# Patient Record
Sex: MALE | Race: WHITE | URBAN - NONMETROPOLITAN AREA
[De-identification: names, ages, dates, MRNs, and addresses within clinical notes are randomized per-mention and may not be internally consistent; named-entity substitution may affect disease eponyms.]

---

## 2018-07-10 ENCOUNTER — OFFICE VISIT (OUTPATIENT)
Dept: FAMILY MEDICINE CLINIC | Age: 53
End: 2018-07-10
Payer: MEDICARE

## 2018-07-10 VITALS
OXYGEN SATURATION: 96 % | BODY MASS INDEX: 40.89 KG/M2 | DIASTOLIC BLOOD PRESSURE: 94 MMHG | RESPIRATION RATE: 18 BRPM | HEIGHT: 70 IN | HEART RATE: 78 BPM | TEMPERATURE: 99.1 F | WEIGHT: 285.6 LBS | SYSTOLIC BLOOD PRESSURE: 179 MMHG

## 2018-07-10 DIAGNOSIS — J40 BRONCHITIS: Primary | ICD-10-CM

## 2018-07-10 PROCEDURE — 99202 OFFICE O/P NEW SF 15 MIN: CPT | Performed by: NURSE PRACTITIONER

## 2018-07-10 RX ORDER — METOPROLOL SUCCINATE 100 MG/1
TABLET, EXTENDED RELEASE ORAL
Refills: 12 | COMMUNITY
Start: 2018-06-24

## 2018-07-10 RX ORDER — AZITHROMYCIN 250 MG/1
TABLET, FILM COATED ORAL
Qty: 6 TABLET | Refills: 0 | Status: SHIPPED | OUTPATIENT
Start: 2018-07-10 | End: 2019-02-15 | Stop reason: ALTCHOICE

## 2018-07-10 RX ORDER — HYDROCODONE BITARTRATE AND ACETAMINOPHEN 5; 325 MG/1; MG/1
TABLET ORAL
Refills: 0 | COMMUNITY
Start: 2018-05-31

## 2018-07-10 RX ORDER — PREDNISONE 20 MG/1
20 TABLET ORAL DAILY
Qty: 5 TABLET | Refills: 0 | Status: SHIPPED | OUTPATIENT
Start: 2018-07-10 | End: 2018-07-15

## 2018-07-10 ASSESSMENT — ENCOUNTER SYMPTOMS
SHORTNESS OF BREATH: 1
BACK PAIN: 1
CHEST TIGHTNESS: 1
NAUSEA: 0
CONSTIPATION: 0
RHINORRHEA: 1
SINUS PRESSURE: 1
SINUS PAIN: 0
SHORTNESS OF BREATH: 0
SORE THROAT: 0
FACIAL SWELLING: 0
WHEEZING: 1
VOMITING: 0
EYE ITCHING: 0
EYE DISCHARGE: 0
TROUBLE SWALLOWING: 0
COLOR CHANGE: 0
CHEST TIGHTNESS: 0
COUGH: 1
HEARTBURN: 0
RHINORRHEA: 0
EYE REDNESS: 0
NAUSEA: 1
DIARRHEA: 0

## 2018-07-10 ASSESSMENT — PATIENT HEALTH QUESTIONNAIRE - PHQ9
2. FEELING DOWN, DEPRESSED OR HOPELESS: 1
SUM OF ALL RESPONSES TO PHQ9 QUESTIONS 1 & 2: 1
SUM OF ALL RESPONSES TO PHQ QUESTIONS 1-9: 1
1. LITTLE INTEREST OR PLEASURE IN DOING THINGS: 0

## 2018-07-10 NOTE — PROGRESS NOTES
Objective:   Physical Exam   Constitutional: He appears well-nourished. Neck: Neck supple. No JVD present. No neck rigidity. No erythema (Blisters previously present that he was given cream for) present. No thyromegaly present. Cardiovascular: Normal rate and regular rhythm. Pulmonary/Chest: No accessory muscle usage. He has decreased breath sounds in the left middle field. Assessment:      Marielnia Dec is 47yo M presenting w/ cough and SOB suggestive of bronchitis. Plan:       Prescribe azithromycin antibiotic and steroid PO to reduce inflammation and help cough. Advise to get flu vaccine and pneumonia vaccine. Recheck BP before discharge.

## 2018-07-10 NOTE — PROGRESS NOTES
change, fever, unexpected weight change and weight loss. HENT: Positive for congestion and sinus pressure. Negative for ear pain, facial swelling, postnasal drip, rhinorrhea, sinus pain, sore throat and trouble swallowing. Eyes: Negative for discharge, redness and itching. Respiratory: Positive for cough and wheezing. Negative for chest tightness and shortness of breath. Cardiovascular: Negative for chest pain. Gastrointestinal: Negative for diarrhea, heartburn, nausea and vomiting. Musculoskeletal: Negative for arthralgias and myalgias. Skin: Negative for color change, pallor and rash. Neurological: Negative for dizziness and headaches. Objective:     BP (!) 179/94 (Site: Left Arm)   Pulse 78   Temp 99.1 °F (37.3 °C) (Oral)   Resp 18   Ht 5' 10\" (1.778 m)   Wt 285 lb 9.6 oz (129.5 kg)   SpO2 96%   BMI 40.98 kg/m²     Physical Exam   Constitutional: He is oriented to person, place, and time. Vital signs are normal. He appears well-developed and well-nourished. He is active and cooperative. Non-toxic appearance. He does not have a sickly appearance. He does not appear ill. No distress. HENT:   Head: Normocephalic and atraumatic. Right Ear: Hearing, external ear and ear canal normal. A middle ear effusion is present. Left Ear: Hearing, external ear and ear canal normal. A middle ear effusion is present. Nose: Nose normal. Right sinus exhibits no maxillary sinus tenderness and no frontal sinus tenderness. Left sinus exhibits no maxillary sinus tenderness and no frontal sinus tenderness. Mouth/Throat: Uvula is midline and mucous membranes are normal. Posterior oropharyngeal erythema present. Cardiovascular: Normal rate and regular rhythm. Pulmonary/Chest: Effort normal. He has decreased breath sounds. Lymphadenopathy:        Head (right side): No submental, no submandibular, no tonsillar, no preauricular, no posterior auricular and no occipital adenopathy present.

## 2019-02-15 ENCOUNTER — OFFICE VISIT (OUTPATIENT)
Dept: FAMILY MEDICINE CLINIC | Age: 54
End: 2019-02-15
Payer: MEDICARE

## 2019-02-15 VITALS
HEART RATE: 69 BPM | BODY MASS INDEX: 43.23 KG/M2 | WEIGHT: 302 LBS | DIASTOLIC BLOOD PRESSURE: 78 MMHG | OXYGEN SATURATION: 95 % | SYSTOLIC BLOOD PRESSURE: 136 MMHG | RESPIRATION RATE: 22 BRPM | HEIGHT: 70 IN

## 2019-02-15 DIAGNOSIS — S05.01XA ABRASION OF RIGHT CORNEA, INITIAL ENCOUNTER: Primary | ICD-10-CM

## 2019-02-15 PROCEDURE — 99213 OFFICE O/P EST LOW 20 MIN: CPT | Performed by: NURSE PRACTITIONER

## 2019-02-15 RX ORDER — GENTAMICIN SULFATE 3 MG/ML
1 SOLUTION/ DROPS OPHTHALMIC 4 TIMES DAILY
Qty: 1 BOTTLE | Refills: 0 | Status: SHIPPED | OUTPATIENT
Start: 2019-02-15 | End: 2019-02-22

## 2019-02-15 RX ORDER — OMEGA-3 FATTY ACIDS/FISH OIL 300-1000MG
1 CAPSULE ORAL
COMMUNITY

## 2019-02-15 ASSESSMENT — PATIENT HEALTH QUESTIONNAIRE - PHQ9
SUM OF ALL RESPONSES TO PHQ QUESTIONS 1-9: 1
2. FEELING DOWN, DEPRESSED OR HOPELESS: 0
SUM OF ALL RESPONSES TO PHQ9 QUESTIONS 1 & 2: 1
1. LITTLE INTEREST OR PLEASURE IN DOING THINGS: 1
SUM OF ALL RESPONSES TO PHQ QUESTIONS 1-9: 1

## 2019-02-15 ASSESSMENT — ENCOUNTER SYMPTOMS
EYE DISCHARGE: 1
EYE ITCHING: 0
SHORTNESS OF BREATH: 0
PHOTOPHOBIA: 0
EYE PAIN: 1
EYE REDNESS: 1

## 2019-05-06 ENCOUNTER — TELEPHONE (OUTPATIENT)
Dept: UROLOGY | Age: 54
End: 2019-05-06

## 2019-05-06 NOTE — TELEPHONE ENCOUNTER
Called and spoke with patient's wife - transition of care received from [facility].   Wife stated she was going to try and get patient in with [facility]

## 2019-05-16 DIAGNOSIS — R10.9 FLANK PAIN: ICD-10-CM

## 2019-05-16 PROBLEM — R31.9 HEMATURIA: Status: ACTIVE | Noted: 2019-05-16

## 2019-05-22 ENCOUNTER — OFFICE VISIT (OUTPATIENT)
Dept: UROLOGY | Age: 54
End: 2019-05-22
Payer: MEDICAID

## 2019-05-22 ENCOUNTER — HOSPITAL ENCOUNTER (OUTPATIENT)
Age: 54
Setting detail: SPECIMEN
Discharge: HOME | End: 2019-05-22
Payer: MEDICAID

## 2019-05-22 VITALS
WEIGHT: 296 LBS | SYSTOLIC BLOOD PRESSURE: 135 MMHG | HEART RATE: 59 BPM | HEIGHT: 70 IN | TEMPERATURE: 98.9 F | OXYGEN SATURATION: 99 % | BODY MASS INDEX: 42.37 KG/M2 | DIASTOLIC BLOOD PRESSURE: 87 MMHG

## 2019-05-22 DIAGNOSIS — R31.0 GROSS HEMATURIA: ICD-10-CM

## 2019-05-22 DIAGNOSIS — N20.0 RENAL CALCULUS: ICD-10-CM

## 2019-05-22 DIAGNOSIS — N20.0 RENAL CALCULUS: Primary | ICD-10-CM

## 2019-05-22 LAB
-: ABNORMAL
AMORPHOUS: ABNORMAL
BACTERIA: ABNORMAL
BILIRUBIN URINE: NEGATIVE
CASTS UA: ABNORMAL /LPF
COLOR: YELLOW
COMMENT UA: ABNORMAL
CRYSTALS, UA: ABNORMAL /HPF
EPITHELIAL CELLS UA: ABNORMAL /HPF (ref 0–5)
GLUCOSE URINE: NEGATIVE
KETONES, URINE: NEGATIVE
LEUKOCYTE ESTERASE, URINE: NEGATIVE
MUCUS: ABNORMAL
NITRITE, URINE: NEGATIVE
OTHER OBSERVATIONS UA: ABNORMAL
PH UA: 6 (ref 5–9)
PROTEIN UA: NEGATIVE
RBC UA: ABNORMAL /HPF (ref 0–2)
RENAL EPITHELIAL, UA: ABNORMAL /HPF
SPECIFIC GRAVITY UA: 1.02 (ref 1.01–1.02)
TRICHOMONAS: ABNORMAL
TURBIDITY: CLEAR
URINE HGB: NEGATIVE
UROBILINOGEN, URINE: NORMAL
WBC UA: ABNORMAL /HPF (ref 0–5)
YEAST: ABNORMAL

## 2019-05-22 PROCEDURE — 99204 OFFICE O/P NEW MOD 45 MIN: CPT | Performed by: UROLOGY

## 2019-05-22 PROCEDURE — 87086 URINE CULTURE/COLONY COUNT: CPT

## 2019-05-22 PROCEDURE — 81001 URINALYSIS AUTO W/SCOPE: CPT

## 2019-05-22 RX ORDER — ALBUTEROL SULFATE 90 UG/1
AEROSOL, METERED RESPIRATORY (INHALATION)
COMMUNITY
Start: 2019-04-12 | End: 2019-05-22 | Stop reason: SDUPTHER

## 2019-05-22 ASSESSMENT — ENCOUNTER SYMPTOMS
VOMITING: 0
COLOR CHANGE: 0
SHORTNESS OF BREATH: 0
WHEEZING: 0
NAUSEA: 0
BACK PAIN: 0
COUGH: 0
EYE PAIN: 0
ABDOMINAL PAIN: 0
EYE REDNESS: 0

## 2019-05-22 NOTE — PROGRESS NOTES
HPI:    Patient is a 62 y.o. male in no acute distress. He is alert and oriented to person, place, and time. Patient is being seen as a new patient. Patient was referred over from the emergency department. Patient did have CT scan performed over there. The report for this film was able to be reviewed today. This did not show any significant calculi. Patient does claim that he had an episode of gross hematuria and possible stone passage prior to coming to the emergency department. He reports no pain today. Patient has had several stones in the past.  His most recent stone is approximately 4 years ago. He has been passing stones since he was 23years old. He has seen urology in the past.  He has never had a metabolic stone workup. He did decline to have a metabolic stone workup today. He has minimal pain today. No lower urinary tract symptoms. Patient is a nonsmoker and works as a . Past Medical History:   Diagnosis Date    Asthma     CHF [facility]congestive heart failure) ([facility])     History of kidney stones     Hypertension     Sleep apnea in adult      Past Surgical History:   Procedure Laterality Date    CHOLECYSTECTOMY      HERNIA REPAIR      SHOULDER SURGERY Left      Outpatient Encounter Medications as of 5/22/2019   Medication Sig Dispense Refill    ibuprofen (ADVIL MIGRAINE) 200 MG CAPS Take 1 capsule by mouth      VENTOLIN  (90 Base) MCG/ACT inhaler INHALE 2 PUFFS BY MOUTH EVERY 4 HOURS AS NEEDED FOR WHEEZING  12    HYDROcodone-acetaminophen (NORCO) 5-325 MG per tablet TAKE 1 TABLET BY MOUTH EVERY 4 HOURS AS NEEDED FOR PAIN  0    metoprolol succinate (TOPROL XL) 100 MG extended release tablet TAKE 1 TABLET BY MOUTH EVERY DAY  12    [DISCONTINUED] METOPROLOL SUCCINATE PO 5 mg      [DISCONTINUED] albuterol sulfate HFA (VENTOLIN HFA) 108 (90 Base) MCG/ACT inhaler        No facility-administered encounter medications on file as of 5/22/2019.        Current normal.  Skin: Normal  Lungs: Respiratory effort normal  Cardiovascular:  Normal peripheral pulses  Abdomen: Soft, non-tender, non-distended with no CVA, flank pain, hepatosplenomegaly or hernia. Kidneys normal.  Bladder non-tender and not distended. Lymphatics: no palpable lymphadenopathy  Penis normal  Urethral meatus normal  Scrotal exam normal  Testicles normal bilaterally  Epididymis normal bilaterally  No evidence of inguinal hernia      No results found for: BUN  No results found for: CREATININE  No results found for: PSA    ASSESSMENT:  This is a 62 y.o. male with the following diagnoses:   Diagnosis Orders   1. Renal calculus  XR ABDOMEN (KUB) (SINGLE AP VIEW)   2. Gross hematuria           PLAN:  We will plan for cystoscopy for lower tract visualization. We will also plan for repeat KUB in 1 year for stone follow-up. We did educate him on stone prevention today.

## 2019-05-23 LAB
CULTURE: NO GROWTH
Lab: NORMAL
SPECIMEN DESCRIPTION: NORMAL

## 2019-05-24 ENCOUNTER — TELEPHONE (OUTPATIENT)
Dept: UROLOGY | Age: 54
End: 2019-05-24

## 2019-05-24 NOTE — TELEPHONE ENCOUNTER
Called patient and informed him of urine culture results.   Advised to keep follow up for cysto as planned

## 2019-05-24 NOTE — TELEPHONE ENCOUNTER
----- Message from OCHOA Richardson - CNP sent at 5/24/2019  9:46 AM EDT -----  Call pt - urine cx reviewed and negative for UTI.  Keep cysto as planned

## 2019-06-24 ENCOUNTER — PROCEDURE VISIT (OUTPATIENT)
Dept: UROLOGY | Age: 54
End: 2019-06-24
Payer: MEDICARE

## 2019-06-24 VITALS — BODY MASS INDEX: 43.19 KG/M2 | DIASTOLIC BLOOD PRESSURE: 78 MMHG | SYSTOLIC BLOOD PRESSURE: 142 MMHG | WEIGHT: 301 LBS

## 2019-06-24 DIAGNOSIS — R31.0 GROSS HEMATURIA: ICD-10-CM

## 2019-06-24 DIAGNOSIS — N20.0 RENAL CALCULUS: Primary | ICD-10-CM

## 2019-06-24 PROCEDURE — 52000 CYSTOURETHROSCOPY: CPT | Performed by: UROLOGY

## 2019-06-24 PROCEDURE — 99213 OFFICE O/P EST LOW 20 MIN: CPT | Performed by: UROLOGY

## 2019-06-24 ASSESSMENT — ENCOUNTER SYMPTOMS
NAUSEA: 0
BACK PAIN: 0
WHEEZING: 0
COUGH: 0
EYE PAIN: 0
COLOR CHANGE: 0
SHORTNESS OF BREATH: 0
VOMITING: 0
EYE REDNESS: 0
ABDOMINAL PAIN: 0

## 2019-06-24 NOTE — PROGRESS NOTES
HPI:    Patient is a 62 y.o. male in no acute distress. He is alert and oriented to person, place, and time. History  Patient is being seen as a new patient. Patient was referred over from the emergency department. Patient did have CT scan performed over there. The report for this film was able to be reviewed today. This did not show any significant calculi. Patient does claim that he had an episode of gross hematuria and possible stone passage prior to coming to the emergency department. He reports no pain today. Patient has had several stones in the past.  His most recent stone is approximately 4 years ago. He has been passing stones since he was 23years old. He has seen urology in the past.  He has never had a metabolic stone workup. He did decline to have a metabolic stone workup today. He has minimal pain today. No lower urinary tract symptoms. Patient is a nonsmoker and works as a . Currently  Patient here today for lower tract visualization for gross hematuria. Patient CT scan have been previously reviewed. We were able to independently review the films today and agree with her previous read. Patient has had no gross hematuria dysuria since last visit. He reports no flank pain nausea or vomiting. Patient said several stones in the past.  We did go over stone prevention from dietary standpoint with him again today. Her pain today    Cystoscopy Procedure Note    Pre-operative Diagnosis: gross hematuria    Post-operative Diagnosis: Same     Surgeon: Fabrice Mcnamara     Assistants: None    Anesthesia : Local    Procedure Details   The risks, benefits, complications, treatment options, and expected outcomes were discussed with the patient. The patient concurred with the proposed plan, giving informed consent. Cystoscopy was performed today under local anesthesia, using sterile technique.  The patient was placed in the dorsal lithotomy position, prepped with CHG, and draped in the usual sterile fashion. A 14 French flexible cystoscope was used to systematically inspect both the urethra and bladder in their entirety. Findings:  Anterior urethra: normal without strictures  Hyperplasia: bilobar  Bladder: Normal mucosa, without lesions. Ureteral orifice(s) was/were seen in the normal position and effluxing clear urine  Trabeculations No  Diverticulum No  Description: Small stones dependent portion of the bladder         Specimens: Cytology/urine culture No                 Complications:  None; patient tolerated the procedure well. Disposition: home           Condition: stable      Past Medical History:   Diagnosis Date    Asthma     CHF (congestive heart failure) ([facility])     History of kidney stones     Hypertension     Sleep apnea in adult      Past Surgical History:   Procedure Laterality Date    CHOLECYSTECTOMY      HERNIA REPAIR      SHOULDER SURGERY Left      Outpatient Encounter Medications as of 6/24/2019   Medication Sig Dispense Refill    ibuprofen (ADVIL MIGRAINE) 200 MG CAPS Take 1 capsule by mouth      VENTOLIN  (90 Base) MCG/ACT inhaler INHALE 2 PUFFS BY MOUTH EVERY 4 HOURS AS NEEDED FOR WHEEZING  12    HYDROcodone-acetaminophen (NORCO) 5-325 MG per tablet TAKE 1 TABLET BY MOUTH EVERY 4 HOURS AS NEEDED FOR PAIN  0    metoprolol succinate (TOPROL XL) 100 MG extended release tablet TAKE 1 TABLET BY MOUTH EVERY DAY  12     No facility-administered encounter medications on file as of 6/24/2019.        Current Outpatient Medications on File Prior to Visit   Medication Sig Dispense Refill    ibuprofen (ADVIL MIGRAINE) 200 MG CAPS Take 1 capsule by mouth      VENTOLIN  (90 Base) MCG/ACT inhaler INHALE 2 PUFFS BY MOUTH EVERY 4 HOURS AS NEEDED FOR WHEEZING  12    HYDROcodone-acetaminophen (NORCO) 5-325 MG per tablet TAKE 1 TABLET BY MOUTH EVERY 4 HOURS AS NEEDED FOR PAIN  0    metoprolol succinate (TOPROL XL) 100 MG extended release tablet TAKE 1 TABLET BY MOUTH EVERY DAY  12     No current facility-administered medications on file prior to visit. Penicillins and Penicillin g  Family History   Problem Relation Age of Onset    Heart Disease Mother     Asthma Mother     Alzheimer's Disease Father     Asthma Sister      Social History     Tobacco Use   Smoking Status Never Smoker   Smokeless Tobacco Never Used       Social History     Substance and Sexual Activity   Alcohol Use No       Review of Systems   Constitutional: Negative for appetite change, chills and fever. Eyes: Negative for pain, redness and visual disturbance. Respiratory: Negative for cough, shortness of breath and wheezing. Cardiovascular: Negative for chest pain and leg swelling. Gastrointestinal: Negative for abdominal pain, nausea and vomiting. Genitourinary: Negative for difficulty urinating, discharge, dysuria, flank pain, frequency, hematuria, scrotal swelling and testicular pain. Musculoskeletal: Negative for back pain, joint swelling and myalgias. Skin: Negative for color change, rash and wound. Neurological: Negative for dizziness, tremors and numbness. Hematological: Negative for adenopathy. Does not bruise/bleed easily. There were no vitals taken for this visit. PHYSICAL EXAM:  Constitutional: Patient in no acute distress; Neuro: alert and oriented to person place and time. Psych: Mood and affect normal.  Skin: Normal  Lungs: Respiratory effort normal  Cardiovascular:  Normal peripheral pulses  Abdomen: Soft, non-tender, non-distended with no CVA, flank pain, hepatosplenomegaly or hernia. Kidneys normal.  Bladder non-tender and not distended.   Lymphatics: no palpable lymphadenopathy  Penis normal  Urethral meatus normal  Scrotal exam normal  Testicles normal bilaterally  Epididymis normal bilaterally  No evidence of inguinal hernia      No results found for: BUN  No results found for: CREATININE  No results found for:

## 2023-08-22 ENCOUNTER — HOSPITAL ENCOUNTER (OUTPATIENT)
Dept: MRI IMAGING | Age: 58
Discharge: HOME | End: 2023-08-22
Payer: MEDICAID

## 2023-08-22 VITALS
RESPIRATION RATE: 18 BRPM | TEMPERATURE: 98.4 F | HEIGHT: 70 IN | DIASTOLIC BLOOD PRESSURE: 77 MMHG | BODY MASS INDEX: 36.36 KG/M2 | HEART RATE: 55 BPM | WEIGHT: 254 LBS | SYSTOLIC BLOOD PRESSURE: 125 MMHG | OXYGEN SATURATION: 95 %

## 2023-08-22 DIAGNOSIS — M54.16 LUMBAR NEURITIS: ICD-10-CM

## 2023-08-22 PROCEDURE — 2580000003 HC RX 258: Performed by: RADIOLOGY

## 2023-08-22 PROCEDURE — 6360000002 HC RX W HCPCS: Performed by: RADIOLOGY

## 2023-08-22 PROCEDURE — 99152 MOD SED SAME PHYS/QHP 5/>YRS: CPT

## 2023-08-22 RX ORDER — PRAVASTATIN SODIUM 40 MG
40 TABLET ORAL DAILY
COMMUNITY

## 2023-08-22 RX ORDER — SODIUM CHLORIDE 450 MG/100ML
INJECTION, SOLUTION INTRAVENOUS CONTINUOUS
Status: DISCONTINUED | OUTPATIENT
Start: 2023-08-22 | End: 2023-08-23 | Stop reason: HOSPADM

## 2023-08-22 RX ORDER — MIDAZOLAM HYDROCHLORIDE 1 MG/ML
1 INJECTION INTRAMUSCULAR; INTRAVENOUS ONCE
Status: COMPLETED | OUTPATIENT
Start: 2023-08-22 | End: 2023-08-22

## 2023-08-22 RX ORDER — LOSARTAN POTASSIUM 25 MG/1
25 TABLET ORAL DAILY
COMMUNITY

## 2023-08-22 RX ORDER — ASPIRIN 81 MG/1
81 TABLET ORAL DAILY
COMMUNITY

## 2023-08-22 RX ADMIN — SODIUM CHLORIDE: 4.5 INJECTION, SOLUTION INTRAVENOUS at 13:18

## 2023-08-22 RX ADMIN — MIDAZOLAM 1 MG: 1 INJECTION INTRAMUSCULAR; INTRAVENOUS at 13:55

## 2023-08-22 ASSESSMENT — PAIN DESCRIPTION - LOCATION: LOCATION: BACK

## 2023-08-22 ASSESSMENT — PAIN DESCRIPTION - DESCRIPTORS: DESCRIPTORS: ACHING

## 2023-08-22 ASSESSMENT — PAIN DESCRIPTION - PAIN TYPE: TYPE: CHRONIC PAIN

## 2023-08-22 ASSESSMENT — PAIN DESCRIPTION - ORIENTATION: ORIENTATION: LOWER

## 2023-08-22 ASSESSMENT — PAIN SCALES - GENERAL: PAINLEVEL_OUTOF10: 7

## 2023-08-22 NOTE — PROGRESS NOTES
1340 Patient received in MRI holding area for pre-assessment. 5 Dr Delfino Pleitez present to assess for MRI sedation. This procedure has been fully reviewed with the patient and written informed consent has been obtained. 1350 Pt taken in to MRI scanner area, attached to monitor and sedation began. See EMAR for medication administration and vitals. 1400 MRI scan started. 1420 MRI scan complete. Pt taken from MRI scanner area to holding area. 1425 Patient taken to OPN via cart.

## 2023-08-22 NOTE — PROGRESS NOTES
1230: Patient arrived ambulatory with wife for MRI with sedation. Patient rights and responsibilities offered to patient. Patient states they have been NPO for 7 hours. Patient's wife at bedside. MRI screening complete.    IV hydration started, patient tolerating well.               _m___ Safety:       (Environmental)  [place] to environment  Ensure ID band is correct and in place/ allergy band as needed  Assess for fall risk  Initiate fall precautions as applicable (fall band, side rails, etc.)  Call light within reach  Bed in low position/ wheels locked    _m___ Pain:       Assess pain level and characteristics  Administer analgesics as ordered  Assess effectiveness of pain management and report to MD as needed    _m___ Knowledge Deficit:  Assess baseline knowledge  Provide teaching at level of understanding  Provide teaching via preferred learning method  Evaluate teaching effectiveness    _m___ Hemodynamic/Respiratory Status:       (Pre and Post Procedure Monitoring)  Assess/Monitor vital signs and LOC  Assess Baseline SpO2 prior to any sedation  Obtain weight/height  Assess vital signs/ LOC until patient meets discharge criteria  Monitor procedure site and notify MD of any issues

## 2023-08-22 NOTE — PROGRESS NOTES
1430: Patient back from procedure, tolerated well. Alert and oriented. Vitals as charted. Provided with snack and beverage. 1445: Vitals as charted. Patient tolerating food well.     1500: Vitals as charted. No concerns voiced. 1515: AVS reviewed with patient, voiced understanding. Patient discharged in wheelchair.

## 2023-08-22 NOTE — DISCHARGE INSTRUCTIONS
SEDATION/ANALGESIA INFORMATION HOME GOING ADVICE    Review the following information with the patient prior to the procedure. Sedation/agalgesia is used during short medical procedures under controlled supervision. The medication will produce a strong relaxation. You will be able to hear, speak and follow instructions, but you memory and alertness will be decreased. You will be able to swallow and breathe on your own. During sedation/analgesia you blood pressure, hear and breathing will be watched closely. After the procedure, you may not remember what was said or done. You may have the following effects from the medication. Drowsiness, dizziness, sleepiness or confusion. Difficulty remembering or delayed reaction times. Loss of fine muscle control or difficulty with your balance especially while walking. Difficulty focusing or blurred vision. You may not be aware of slight changes in your behavior and/or your reaction time because of the medication used during the procedure. Therefore you should follow these instructions. Have someone responsible help you with your care. Do not drive for 24 hours. Do not operate equipment for 24 hours (lawnmowers, power tools, kitchen accessories, stove). Do not drink any alcoholic beverages for a minimum of 24 hours. Do not make important personal, legal or business decisions for 24 hours. You may experience dizziness or lightheadedness. Move slowly and carefully, do not make sudden position changes. Drink extra amounts of fluids today. Increase your diet as tolerated (unless you have received specific instructions from your doctor). If you feel nauseated, continue with liquids until nausea is gone  Notify your physician if you have not urinated within 8 hours after the procedure. Resume your medications unless otherwise instructed. contact your physician if you have any questions or concerns.   I have been informed and understand how to care for

## 2025-07-03 ENCOUNTER — HOSPITAL ENCOUNTER (OUTPATIENT)
Dept: MRI IMAGING | Age: 60
Discharge: HOME | End: 2025-07-03
Payer: MEDICAID

## 2025-07-03 VITALS
SYSTOLIC BLOOD PRESSURE: 148 MMHG | BODY MASS INDEX: 41.47 KG/M2 | TEMPERATURE: 97.5 F | RESPIRATION RATE: 16 BRPM | HEART RATE: 53 BPM | DIASTOLIC BLOOD PRESSURE: 72 MMHG | OXYGEN SATURATION: 96 % | WEIGHT: 280 LBS | HEIGHT: 69 IN

## 2025-07-03 DIAGNOSIS — M54.2 NECK PAIN: ICD-10-CM

## 2025-07-03 PROCEDURE — 99152 MOD SED SAME PHYS/QHP 5/>YRS: CPT

## 2025-07-03 PROCEDURE — 6360000002 HC RX W HCPCS: Performed by: RADIOLOGY

## 2025-07-03 RX ORDER — MIDAZOLAM HYDROCHLORIDE 1 MG/ML
1 INJECTION, SOLUTION INTRAMUSCULAR; INTRAVENOUS ONCE
Status: COMPLETED | OUTPATIENT
Start: 2025-07-03 | End: 2025-07-03

## 2025-07-03 RX ADMIN — MIDAZOLAM 1 MG: 1 INJECTION INTRAMUSCULAR; INTRAVENOUS at 14:26

## 2025-07-03 RX ADMIN — MIDAZOLAM 1 MG: 1 INJECTION INTRAMUSCULAR; INTRAVENOUS at 14:23

## 2025-07-03 ASSESSMENT — PAIN DESCRIPTION - DESCRIPTORS: DESCRIPTORS: ACHING

## 2025-07-03 ASSESSMENT — PAIN DESCRIPTION - LOCATION: LOCATION: NECK;BACK

## 2025-07-03 ASSESSMENT — PAIN DESCRIPTION - ORIENTATION: ORIENTATION: LEFT

## 2025-07-03 ASSESSMENT — PAIN DESCRIPTION - PAIN TYPE: TYPE: CHRONIC PAIN

## 2025-07-03 ASSESSMENT — PAIN SCALES - GENERAL: PAINLEVEL_OUTOF10: 4

## 2025-07-03 NOTE — PROGRESS NOTES
1232 Patient arrived to [facility] ambulatory for MRI with sedation.  Oriented to room and call light  PT RIGHTS AND RESPONSIBILITIES OFFERED TO PT.  He states his wife is driving, and he has been NPO.      1505 patient returned to [facility].  Vitals as charted.  Snack and drink provided.  He denies complaints at this time    1520 vitals as charted.  He denies complaints at this time     1530 iv removed.  Discharge instructions given and explained and he denies questions.      1541 Discharged in wheel chair     _M___ Safety:       (Environmental)  [place] to environment  Ensure ID band is correct and in place/ allergy band as needed  Assess for fall risk  Initiate fall precautions as applicable (fall band, side rails, etc.)  Call light within reach  Bed in low position/ wheels locked    _M___ Pain:       Assess pain level and characteristics  Administer analgesics as ordered  Assess effectiveness of pain management and report to MD as needed    __M__ Knowledge Deficit:  Assess baseline knowledge  Provide teaching at level of understanding  Provide teaching via preferred learning method  Evaluate teaching effectiveness    __M__ Hemodynamic/Respiratory Status:       (Pre and Post Procedure Monitoring)  Assess/Monitor vital signs and LOC  Assess Baseline SpO2 prior to any sedation  Obtain weight/height  Assess vital signs/ LOC until patient meets discharge criteria  Monitor procedure site and notify MD of any issues

## 2025-07-03 NOTE — PROGRESS NOTES
1405 Patient received in MRI holding area for pre-assessment.  1410 Dr Harris present to assess for MRI sedation.   1412 This procedure has been fully reviewed with the patient and written informed consent has been obtained.  1418 Pt taken in to MRI scanner area, attached to monitor and sedation began. See EMAR for medication administration and vitals.   1427 MRI scan started.   1450 MRI scan complete. Pt taken from MRI scanner area to holding area.    1458 Patient taken to OPN via cart. Report called to Angelica BUENO in OPN.                  Consent: The risks of the medication were reviewed with the patient.

## 2025-07-03 NOTE — DISCHARGE INSTRUCTIONS
SEDATION/ANALGESIA INFORMATION HOME GOING ADVICE    Review the following information with the patient prior to the procedure.  Sedation/agalgesia is used during short medical procedures under controlled supervision.  The medication will produce a strong relaxation.  You will be able to hear, speak and follow instructions, but you memory and alertness will be decreased.  You will be able to swallow and breathe on your own.  During sedation/analgesia you blood pressure, hear and breathing will be watched closely.  After the procedure, you may not remember what was said or done.    Procedure: MRI with sedation        Date: 7/3/25  You may have the following effects from the medication.  Drowsiness, dizziness, sleepiness or confusion.  Difficulty remembering or delayed reaction times.  Loss of fine muscle control or difficulty with your balance especially while walking.  Difficulty focusing or blurred vision.  You may not be aware of slight changes in your behavior and/or your reaction time because of the medication used during the procedure.  Therefore you should follow these instructions.  Have someone responsible help you with your care.  Do not drive for 24 hours.  Do not operate equipment for 24 hours (lawnmowers, power tools, kitchen accessories, stove).  Do not drink any alcoholic beverages for a minimum of 24 hours.  Do not make important personal, legal or business decisions for 24 hours.  You may experience dizziness or lightheadedness.  Move slowly and carefully, do not make sudden position changes.  Drink extra amounts of fluids today.  Increase your diet as tolerated (unless you have received specific instructions from your doctor).  If you feel nauseated, continue with liquids until nausea is gone  Notify your physician if you have not urinated within 8 hours after the procedure.  Resume your medications unless otherwise instructed.  contact your physician if you have any questions or concerns.  I have